# Patient Record
Sex: FEMALE | Race: WHITE | NOT HISPANIC OR LATINO | Employment: UNEMPLOYED | URBAN - METROPOLITAN AREA
[De-identification: names, ages, dates, MRNs, and addresses within clinical notes are randomized per-mention and may not be internally consistent; named-entity substitution may affect disease eponyms.]

---

## 2017-12-01 RX ORDER — OMEPRAZOLE 20 MG/1
CAPSULE, DELAYED RELEASE ORAL
Qty: 90 CAPSULE | Refills: 0 | OUTPATIENT
Start: 2017-12-01

## 2017-12-01 NOTE — TELEPHONE ENCOUNTER
Received refill request for Prilosec 20 mg but pt has not been seen in few years.  Sent mess back to pharmacy, pt needs appt.

## 2018-04-24 ENCOUNTER — OFFICE VISIT (OUTPATIENT)
Dept: OBSTETRICS AND GYNECOLOGY | Facility: CLINIC | Age: 58
End: 2018-04-24

## 2018-04-24 VITALS
HEIGHT: 63 IN | DIASTOLIC BLOOD PRESSURE: 80 MMHG | BODY MASS INDEX: 23.57 KG/M2 | SYSTOLIC BLOOD PRESSURE: 120 MMHG | WEIGHT: 133 LBS

## 2018-04-24 DIAGNOSIS — Z13.9 SCREENING FOR CONDITION: Primary | ICD-10-CM

## 2018-04-24 DIAGNOSIS — Z01.419 PAP SMEAR, LOW-RISK: ICD-10-CM

## 2018-04-24 DIAGNOSIS — N95.1 MENOPAUSAL SYMPTOMS: ICD-10-CM

## 2018-04-24 DIAGNOSIS — Z11.51 SPECIAL SCREENING EXAMINATION FOR HUMAN PAPILLOMAVIRUS (HPV): ICD-10-CM

## 2018-04-24 LAB
BILIRUB BLD-MCNC: NEGATIVE MG/DL
CLARITY, POC: CLEAR
COLOR UR: YELLOW
GLUCOSE UR STRIP-MCNC: NEGATIVE MG/DL
KETONES UR QL: NEGATIVE
LEUKOCYTE EST, POC: NEGATIVE
NITRITE UR-MCNC: NEGATIVE MG/ML
PH UR: 6 [PH] (ref 5–8)
PROT UR STRIP-MCNC: NEGATIVE MG/DL
RBC # UR STRIP: NEGATIVE /UL
SP GR UR: 1.02 (ref 1–1.03)
UROBILINOGEN UR QL: NORMAL

## 2018-04-24 PROCEDURE — 99396 PREV VISIT EST AGE 40-64: CPT | Performed by: OBSTETRICS & GYNECOLOGY

## 2018-04-24 PROCEDURE — 99213 OFFICE O/P EST LOW 20 MIN: CPT | Performed by: OBSTETRICS & GYNECOLOGY

## 2018-04-24 PROCEDURE — 81002 URINALYSIS NONAUTO W/O SCOPE: CPT | Performed by: OBSTETRICS & GYNECOLOGY

## 2018-04-24 RX ORDER — MEDROXYPROGESTERONE ACETATE 2.5 MG/1
2.5 TABLET ORAL DAILY
Qty: 30 TABLET | Refills: 11 | Status: SHIPPED | OUTPATIENT
Start: 2018-04-24 | End: 2019-04-28 | Stop reason: SDUPTHER

## 2018-04-24 RX ORDER — ESTRADIOL 1 MG/1
1 TABLET ORAL DAILY
Qty: 30 TABLET | Refills: 11 | Status: SHIPPED | OUTPATIENT
Start: 2018-04-24 | End: 2019-04-28 | Stop reason: SDUPTHER

## 2018-04-24 NOTE — PROGRESS NOTES
GYN Annual Exam     CC- Here for annual exam.     Ayesha Pichardo is a 58 y.o. female who presents for annual well woman exam. Periods are absent.  Patient is here for well woman exam.  Has not been seen since .  Patient is a pharmacist.  When she stopped her oral contraceptive pills of menopause she became symptomatic from her menopausal symptoms.  They were bad enough that she took her oral birth control pills and cut them in half.  This is been enough to stave off her symptoms.  She is interested in starting traditional HRT.  She is a nonsmoker and no cardiovascular risk factors.    OB History      Para Term  AB Living    0 0 0 0 0 0    SAB TAB Ectopic Molar Multiple Live Births    0 0 0 0 0 0          Current contraception: post menopausal status  History of abnormal Pap smear: no  Family history of uterine, colon or ovarian cancer: no  History of abnormal mammogram: no  Family history of breast cancer: no  Last Pap : 2013    History reviewed. No pertinent past medical history.    History reviewed. No pertinent surgical history.      Current Outpatient Prescriptions:   •  estradiol (ESTRACE) 1 MG tablet, Take 1 tablet by mouth Daily., Disp: 30 tablet, Rfl: 11  •  medroxyPROGESTERone (PROVERA) 2.5 MG tablet, Take 1 tablet by mouth Daily., Disp: 30 tablet, Rfl: 11    No Known Allergies    Social History   Substance Use Topics   • Smoking status: Never Smoker   • Smokeless tobacco: Not on file   • Alcohol use Not on file       No family history on file.    Review of Systems   Constitutional: Negative for appetite change, fever and unexpected weight change.   Respiratory: Negative for cough and shortness of breath.    Cardiovascular: Negative for chest pain and palpitations.   Gastrointestinal: Negative for abdominal distention, abdominal pain, constipation, diarrhea, nausea and vomiting.   Endocrine: Positive for heat intolerance.   Genitourinary: Negative for dyspareunia, menstrual problem,  "pelvic pain and vaginal discharge.   Skin: Negative.    Hematological: Negative.    Psychiatric/Behavioral: Negative for dysphoric mood and sleep disturbance. The patient is not nervous/anxious.        /80   Ht 160 cm (63\")   Wt 60.3 kg (133 lb)   BMI 23.56 kg/m²     Physical Exam   Constitutional: She is oriented to person, place, and time. She appears well-developed and well-nourished.   HENT:   Head: Normocephalic and atraumatic.   Neck: Normal range of motion. Neck supple. No thyromegaly present.   Cardiovascular: Normal rate and regular rhythm.    Pulmonary/Chest: Effort normal and breath sounds normal. Right breast exhibits no mass and no nipple discharge. Left breast exhibits no mass and no nipple discharge. Breasts are symmetrical. There is no breast swelling.   Abdominal: Soft. She exhibits no distension and no mass. There is no tenderness. There is no rebound and no guarding.   Genitourinary: Vagina normal and uterus normal. No breast tenderness, discharge or bleeding. Pelvic exam was performed with patient prone. There is no lesion on the right labia. There is no lesion on the left labia. Cervix exhibits no motion tenderness and no discharge. Right adnexum displays no mass. Left adnexum displays no mass.   Musculoskeletal: Normal range of motion. She exhibits no edema.   Neurological: She is alert and oriented to person, place, and time.   Skin: Skin is warm and dry.   Psychiatric: She has a normal mood and affect. Her behavior is normal. Judgment and thought content normal.   Nursing note and vitals reviewed.      Diagnoses and all orders for this visit:    Screening for condition  -     POC Urinalysis Dipstick    Menopausal symptoms    Pap smear, low-risk  -     Pap IG, HPV-hr    Special screening examination for human papillomavirus (HPV)  -     Pap IG, HPV-hr    Other orders  -     estradiol (ESTRACE) 1 MG tablet; Take 1 tablet by mouth Daily.  -     medroxyPROGESTERone (PROVERA) 2.5 MG " tablet; Take 1 tablet by mouth Daily.        Assessment     1) GYN annual well woman exam.   2) new start HRT.  We will start Estrace 1 mg and Provera 2.5 mg.  She will take for 2 months and see if that's enough to make her symptoms are tolerable.  If not she can call back and we will increase to the next higher dose.  3) breast health-normal clinical breast exam today.  Patient declines order for mammogram due to fear of radiation.     Plan     1) Breast Health - Clinical breast exam & mammogram yearly, Self breast awareness monthly  2) Pap - done today  3) Smoking status- Never smoker  4) Colon health - screening colonoscopy recommended if not up to date  5) Bone health - Weight bearing exercise, dietary calcium recommendations and vitamin D reviewed.   6) Seat belts recommended  7) Follow up prn and one year    Encounter Diagnoses   Name Primary?   • Screening for condition Yes   • Menopausal symptoms    • Pap smear, low-risk    • Special screening examination for human papillomavirus (HPV)          Jose Rodriguez MD  4/24/2018  9:31 AM

## 2018-04-26 LAB
CYTOLOGIST CVX/VAG CYTO: NORMAL
CYTOLOGY CVX/VAG DOC THIN PREP: NORMAL
DX ICD CODE: NORMAL
HIV 1 & 2 AB SER-IMP: NORMAL
HPV I/H RISK 1 DNA CVX QL PROBE+SIG AMP: NEGATIVE
OTHER STN SPEC: NORMAL
PATH REPORT.FINAL DX SPEC: NORMAL
STAT OF ADQ CVX/VAG CYTO-IMP: NORMAL

## 2019-05-01 RX ORDER — MEDROXYPROGESTERONE ACETATE 2.5 MG/1
TABLET ORAL
Qty: 90 TABLET | Refills: 3 | Status: SHIPPED | OUTPATIENT
Start: 2019-05-01 | End: 2020-08-17

## 2019-05-01 RX ORDER — ESTRADIOL 1 MG/1
TABLET ORAL
Qty: 90 TABLET | Refills: 3 | Status: SHIPPED | OUTPATIENT
Start: 2019-05-01 | End: 2020-08-17

## 2020-08-17 RX ORDER — MEDROXYPROGESTERONE ACETATE 2.5 MG/1
TABLET ORAL
Qty: 90 TABLET | Refills: 3 | Status: SHIPPED | OUTPATIENT
Start: 2020-08-17 | End: 2021-11-03

## 2020-08-17 RX ORDER — ESTRADIOL 1 MG/1
TABLET ORAL
Qty: 90 TABLET | Refills: 3 | Status: SHIPPED | OUTPATIENT
Start: 2020-08-17 | End: 2021-11-03

## 2021-11-03 RX ORDER — ESTRADIOL 1 MG/1
TABLET ORAL
Qty: 90 TABLET | Refills: 0 | Status: SHIPPED | OUTPATIENT
Start: 2021-11-03 | End: 2022-02-08

## 2021-11-03 RX ORDER — MEDROXYPROGESTERONE ACETATE 2.5 MG/1
TABLET ORAL
Qty: 90 TABLET | Refills: 0 | Status: SHIPPED | OUTPATIENT
Start: 2021-11-03 | End: 2022-02-08

## 2022-02-08 RX ORDER — ESTRADIOL 1 MG/1
TABLET ORAL
Qty: 90 TABLET | Refills: 0 | Status: SHIPPED | OUTPATIENT
Start: 2022-02-08 | End: 2022-04-19 | Stop reason: SDUPTHER

## 2022-02-08 RX ORDER — MEDROXYPROGESTERONE ACETATE 2.5 MG/1
TABLET ORAL
Qty: 90 TABLET | Refills: 0 | Status: SHIPPED | OUTPATIENT
Start: 2022-02-08 | End: 2022-04-19 | Stop reason: SDUPTHER

## 2022-02-08 RX ORDER — ESTRADIOL 1 MG/1
TABLET ORAL
Qty: 90 TABLET | Refills: 0 | Status: SHIPPED | OUTPATIENT
Start: 2022-02-08 | End: 2022-02-08

## 2022-02-08 RX ORDER — MEDROXYPROGESTERONE ACETATE 2.5 MG/1
TABLET ORAL
Qty: 90 TABLET | Refills: 0 | Status: SHIPPED | OUTPATIENT
Start: 2022-02-08 | End: 2022-02-08

## 2022-04-19 ENCOUNTER — OFFICE VISIT (OUTPATIENT)
Dept: OBSTETRICS AND GYNECOLOGY | Facility: CLINIC | Age: 62
End: 2022-04-19

## 2022-04-19 VITALS
WEIGHT: 146 LBS | BODY MASS INDEX: 25.87 KG/M2 | SYSTOLIC BLOOD PRESSURE: 134 MMHG | HEIGHT: 63 IN | DIASTOLIC BLOOD PRESSURE: 86 MMHG

## 2022-04-19 DIAGNOSIS — Z01.419 ROUTINE GYNECOLOGICAL EXAMINATION: Primary | ICD-10-CM

## 2022-04-19 DIAGNOSIS — Z01.419 PAP SMEAR, LOW-RISK: ICD-10-CM

## 2022-04-19 DIAGNOSIS — Z11.51 SPECIAL SCREENING EXAMINATION FOR HUMAN PAPILLOMAVIRUS (HPV): ICD-10-CM

## 2022-04-19 LAB
BILIRUB BLD-MCNC: NEGATIVE MG/DL
CLARITY, POC: CLEAR
COLOR UR: YELLOW
GLUCOSE UR STRIP-MCNC: NEGATIVE MG/DL
KETONES UR QL: NEGATIVE
LEUKOCYTE EST, POC: NEGATIVE
NITRITE UR-MCNC: NEGATIVE MG/ML
PH UR: 5 [PH] (ref 5–8)
PROT UR STRIP-MCNC: NEGATIVE MG/DL
RBC # UR STRIP: NEGATIVE /UL
SP GR UR: 1 (ref 1–1.03)
UROBILINOGEN UR QL: NORMAL

## 2022-04-19 PROCEDURE — 81002 URINALYSIS NONAUTO W/O SCOPE: CPT | Performed by: OBSTETRICS & GYNECOLOGY

## 2022-04-19 PROCEDURE — 99386 PREV VISIT NEW AGE 40-64: CPT | Performed by: OBSTETRICS & GYNECOLOGY

## 2022-04-19 RX ORDER — MULTIPLE VITAMINS W/ MINERALS TAB 9MG-400MCG
1 TAB ORAL DAILY
COMMUNITY

## 2022-04-19 RX ORDER — MEDROXYPROGESTERONE ACETATE 2.5 MG/1
2.5 TABLET ORAL DAILY
Qty: 90 TABLET | Refills: 3 | Status: SHIPPED | OUTPATIENT
Start: 2022-04-19

## 2022-04-19 RX ORDER — ESTRADIOL 1 MG/1
1 TABLET ORAL DAILY
Qty: 90 TABLET | Refills: 3 | Status: SHIPPED | OUTPATIENT
Start: 2022-04-19

## 2022-04-19 NOTE — PROGRESS NOTES
GYN Annual Exam     CC- Here for annual exam.     Ayesha Pichardo is a 62 y.o. female who presents for annual well woman exam. Periods are absent.     OB History        0    Para   0    Term   0       0    AB   0    Living   0       SAB   0    IAB   0    Ectopic   0    Molar   0    Multiple   0    Live Births   0                Current contraception: post menopausal status  History of abnormal Pap smear: no  Family history of uterine, colon or ovarian cancer: no  History of abnormal mammogram: no, declines testing repeatedly  Family history of breast cancer: no  Last Pap : 2019 N/N    No past medical history on file.    No past surgical history on file.      Current Outpatient Medications:   •  Calcium Carb-Cholecalciferol (Calcium 500/D) 500-400 MG-UNIT chewable tablet, Chew., Disp: , Rfl:   •  estradiol (ESTRACE) 1 MG tablet, Take 1 tablet by mouth Daily., Disp: 90 tablet, Rfl: 3  •  medroxyPROGESTERone (PROVERA) 2.5 MG tablet, Take 1 tablet by mouth Daily., Disp: 90 tablet, Rfl: 3  •  multivitamin with minerals tablet tablet, Take 1 tablet by mouth Daily., Disp: , Rfl:     No Known Allergies    Social History     Tobacco Use   • Smoking status: Never Smoker         No family history on file.    Review of Systems   Constitutional: Negative for appetite change, fever and unexpected weight change.   HENT: Negative for congestion and sore throat.    Respiratory: Negative for cough and shortness of breath.    Cardiovascular: Negative for chest pain and palpitations.   Gastrointestinal: Negative for abdominal distention, abdominal pain, constipation, diarrhea, nausea and vomiting.   Endocrine: Negative.    Genitourinary: Negative for dyspareunia, menstrual problem, pelvic pain and vaginal discharge.   Skin: Negative.    Neurological: Negative for dizziness and syncope.   Hematological: Negative.    Psychiatric/Behavioral: Negative for dysphoric mood and sleep disturbance. The patient is not  "nervous/anxious.        /86   Ht 160 cm (62.99\")   Wt 66.2 kg (146 lb)   Breastfeeding No   BMI 25.87 kg/m²     Physical Exam  Vitals and nursing note reviewed. Exam conducted with a chaperone present.   Constitutional:       Appearance: She is well-developed.   HENT:      Head: Normocephalic and atraumatic.   Neck:      Thyroid: No thyromegaly.   Cardiovascular:      Rate and Rhythm: Normal rate and regular rhythm.   Pulmonary:      Effort: Pulmonary effort is normal.      Breath sounds: Normal breath sounds.   Chest:   Breasts: Breasts are symmetrical.      Right: No mass or nipple discharge.      Left: No mass or nipple discharge.       Abdominal:      General: Bowel sounds are normal. There is no distension.      Palpations: Abdomen is soft. There is no mass.      Tenderness: There is no abdominal tenderness. There is no guarding or rebound.   Genitourinary:     General: Normal vulva.      Exam position: Supine.      Labia:         Right: No lesion.         Left: No lesion.       Vagina: Normal.      Cervix: No cervical motion tenderness or discharge.      Uterus: Normal.       Adnexa:         Right: No mass.          Left: No mass.     Musculoskeletal:         General: Normal range of motion.      Cervical back: Normal range of motion and neck supple.   Skin:     General: Skin is warm and dry.   Neurological:      Mental Status: She is alert and oriented to person, place, and time.   Psychiatric:         Behavior: Behavior normal.         Thought Content: Thought content normal.         Judgment: Judgment normal.         Diagnoses and all orders for this visit:    Routine gynecological examination  -     POC Urinalysis Dipstick    Pap smear, low-risk  -     IgP, Aptima HPV    Special screening examination for human papillomavirus (HPV)  -     IgP, Aptima HPV    Other orders  -     Calcium Carb-Cholecalciferol (Calcium 500/D) 500-400 MG-UNIT chewable tablet; Chew.  -     multivitamin with minerals " tablet tablet; Take 1 tablet by mouth Daily.  -     estradiol (ESTRACE) 1 MG tablet; Take 1 tablet by mouth Daily.  -     medroxyPROGESTERone (PROVERA) 2.5 MG tablet; Take 1 tablet by mouth Daily.        Assessment     1) GYN annual well woman exam.   2) Declines MMG, f/u pap     Plan     1) Breast Health - Clinical breast exam & mammogram yearly, Self breast awareness monthly  2) Pap - done today  3) Smoking status - Ayesha Pichardo  reports that she has never smoked. She does not have any smokeless tobacco history on file.. I have educated her on the risk of diseases from using tobacco products such as cancer, COPD and heart disease.   4) Colon health - screening colonoscopy recommended if not up to date  5) Bone health - Weight bearing exercise, dietary calcium recommendations and vitamin D reviewed.   6) Seat belts recommended  7) Follow up prn and one year    Encounter Diagnoses   Name Primary?   • Routine gynecological examination Yes   • Pap smear, low-risk    • Special screening examination for human papillomavirus (HPV)          Jose Rodriguez MD  4/19/2022  13:43 EDT

## 2022-04-26 LAB
CYTOLOGIST CVX/VAG CYTO: NORMAL
CYTOLOGY CVX/VAG DOC CYTO: NORMAL
CYTOLOGY CVX/VAG DOC THIN PREP: NORMAL
DX ICD CODE: NORMAL
HIV 1 & 2 AB SER-IMP: NORMAL
HPV I/H RISK 4 DNA CVX QL PROBE+SIG AMP: NEGATIVE
Lab: NORMAL
OTHER STN SPEC: NORMAL
STAT OF ADQ CVX/VAG CYTO-IMP: NORMAL

## 2023-04-18 RX ORDER — MEDROXYPROGESTERONE ACETATE 2.5 MG/1
2.5 TABLET ORAL DAILY
Qty: 90 TABLET | Refills: 3 | Status: SHIPPED | OUTPATIENT
Start: 2023-04-18

## 2023-04-18 RX ORDER — ESTRADIOL 1 MG/1
1 TABLET ORAL DAILY
Qty: 90 TABLET | Refills: 3 | Status: SHIPPED | OUTPATIENT
Start: 2023-04-18

## 2024-06-10 RX ORDER — MEDROXYPROGESTERONE ACETATE 2.5 MG/1
2.5 TABLET ORAL DAILY
Qty: 90 TABLET | Refills: 3 | Status: SHIPPED | OUTPATIENT
Start: 2024-06-10

## 2024-06-10 RX ORDER — ESTRADIOL 1 MG/1
1 TABLET ORAL DAILY
Qty: 90 TABLET | Refills: 3 | Status: SHIPPED | OUTPATIENT
Start: 2024-06-10

## 2024-09-06 ENCOUNTER — OFFICE VISIT (OUTPATIENT)
Dept: OBSTETRICS AND GYNECOLOGY | Facility: CLINIC | Age: 64
End: 2024-09-06
Payer: COMMERCIAL

## 2024-09-06 VITALS
HEIGHT: 63 IN | WEIGHT: 150.4 LBS | SYSTOLIC BLOOD PRESSURE: 122 MMHG | BODY MASS INDEX: 26.65 KG/M2 | DIASTOLIC BLOOD PRESSURE: 82 MMHG

## 2024-09-06 DIAGNOSIS — Z01.419 ROUTINE GYNECOLOGICAL EXAMINATION: Primary | ICD-10-CM

## 2024-09-06 RX ORDER — MEDROXYPROGESTERONE ACETATE 2.5 MG/1
2.5 TABLET ORAL DAILY
Qty: 90 TABLET | Refills: 3 | Status: SHIPPED | OUTPATIENT
Start: 2024-09-06

## 2024-09-06 RX ORDER — ESTRADIOL 1 MG/1
1 TABLET ORAL DAILY
Qty: 90 TABLET | Refills: 3 | Status: SHIPPED | OUTPATIENT
Start: 2024-09-06

## 2024-09-06 NOTE — PROGRESS NOTES
GYN Annual Exam     CC- Here for annual exam.     Ayesha Pichardo is a 64 y.o. female who presents for annual well woman exam. Periods are absent.  Doing well on HRT.  No bleeding.  No chest pain no symptoms of DVT.    OB History          0    Para   0    Term   0       0    AB   0    Living   0         SAB   0    IAB   0    Ectopic   0    Molar   0    Multiple   0    Live Births   0                Current contraception: post menopausal status  History of abnormal Pap smear: no  Family history of uterine, colon or ovarian cancer: no  History of abnormal mammogram: no  Family history of breast cancer: no  Last Pap :  N/N    No past medical history on file.    No past surgical history on file.      Current Outpatient Medications:     Calcium Carb-Cholecalciferol (Calcium 500/D) 500-400 MG-UNIT chewable tablet, Chew., Disp: , Rfl:     estradiol (ESTRACE) 1 MG tablet, Take 1 tablet by mouth Daily., Disp: 90 tablet, Rfl: 3    medroxyPROGESTERone (PROVERA) 2.5 MG tablet, Take 1 tablet by mouth Daily., Disp: 90 tablet, Rfl: 3    multivitamin with minerals tablet tablet, Take 1 tablet by mouth Daily., Disp: , Rfl:     No Known Allergies    Social History     Tobacco Use    Smoking status: Never         No family history on file.    Review of Systems   Constitutional:  Negative for appetite change, fever and unexpected weight change.   HENT:  Negative for congestion and sore throat.    Respiratory:  Negative for cough and shortness of breath.    Cardiovascular:  Negative for chest pain and palpitations.   Gastrointestinal:  Negative for abdominal distention, abdominal pain, constipation, diarrhea, nausea and vomiting.   Endocrine: Negative.    Genitourinary:  Negative for dyspareunia, menstrual problem, pelvic pain and vaginal discharge.   Skin: Negative.    Neurological:  Negative for dizziness and syncope.   Hematological: Negative.    Psychiatric/Behavioral:  Negative for dysphoric mood and sleep  "disturbance. The patient is not nervous/anxious.      /82   Ht 160 cm (62.99\")   Wt 68.2 kg (150 lb 6.4 oz)   Breastfeeding No   BMI 26.65 kg/m²     Physical Exam  Vitals and nursing note reviewed. Exam conducted with a chaperone present.   Constitutional:       Appearance: She is well-developed.   HENT:      Head: Normocephalic and atraumatic.   Neck:      Thyroid: No thyromegaly.   Cardiovascular:      Rate and Rhythm: Normal rate and regular rhythm.   Pulmonary:      Effort: Pulmonary effort is normal.      Breath sounds: Normal breath sounds.   Chest:   Breasts:     Breasts are symmetrical.      Right: No mass or nipple discharge.      Left: No mass or nipple discharge.   Abdominal:      General: Bowel sounds are normal. There is no distension.      Palpations: Abdomen is soft. There is no mass.      Tenderness: There is no abdominal tenderness. There is no guarding or rebound.   Genitourinary:     General: Normal vulva.      Exam position: Supine.      Labia:         Right: No lesion.         Left: No lesion.       Vagina: Normal.      Cervix: No cervical motion tenderness or discharge.      Uterus: Normal.       Adnexa:         Right: No mass.          Left: No mass.     Musculoskeletal:         General: Normal range of motion.      Cervical back: Normal range of motion and neck supple.   Skin:     General: Skin is warm and dry.   Neurological:      Mental Status: She is alert and oriented to person, place, and time.   Psychiatric:         Behavior: Behavior normal.         Thought Content: Thought content normal.         Judgment: Judgment normal.     Diagnoses and all orders for this visit:    Routine gynecological examination  -     POC Urinalysis Dipstick    Other orders  -     medroxyPROGESTERone (PROVERA) 2.5 MG tablet; Take 1 tablet by mouth Daily.  -     estradiol (ESTRACE) 1 MG tablet; Take 1 tablet by mouth Daily.        Assessment     1) GYN annual well woman exam.   2) patient declines " MMGs  3) Refill HRT     Plan     1) Breast Health - Clinical breast exam & mammogram yearly, Self breast awareness monthly  2) Pap - Current  3) Smoking status - Ayesha Pichardo  reports that she has never smoked. She does not have any smokeless tobacco history on file. I have educated her on the risk of diseases from using tobacco products such as cancer, COPD, and heart disease.   4) Colon health - screening colonoscopy recommended if not up to date  5) Bone health - Weight bearing exercise, dietary calcium recommendations and vitamin D reviewed.   6) Seat belts recommended  7) Follow up prn and one year    Encounter Diagnoses   Name Primary?    Routine gynecological examination Yes         Jose Rodriguez MD  9/6/2024  09:07 EDT